# Patient Record
Sex: MALE | Race: BLACK OR AFRICAN AMERICAN
[De-identification: names, ages, dates, MRNs, and addresses within clinical notes are randomized per-mention and may not be internally consistent; named-entity substitution may affect disease eponyms.]

---

## 2019-05-29 ENCOUNTER — HOSPITAL ENCOUNTER (INPATIENT)
Dept: HOSPITAL 74 - YASAS | Age: 54
LOS: 3 days | Discharge: OTHER INSTITUTION | End: 2019-06-01
Attending: SURGERY | Admitting: SURGERY
Payer: COMMERCIAL

## 2019-05-29 VITALS — BODY MASS INDEX: 18.1 KG/M2

## 2019-05-29 DIAGNOSIS — B18.2: ICD-10-CM

## 2019-05-29 DIAGNOSIS — K21.9: ICD-10-CM

## 2019-05-29 DIAGNOSIS — Z87.828: ICD-10-CM

## 2019-05-29 DIAGNOSIS — I10: ICD-10-CM

## 2019-05-29 DIAGNOSIS — F10.230: Primary | ICD-10-CM

## 2019-05-29 DIAGNOSIS — Z21: ICD-10-CM

## 2019-05-29 PROCEDURE — HZ2ZZZZ DETOXIFICATION SERVICES FOR SUBSTANCE ABUSE TREATMENT: ICD-10-PCS | Performed by: SURGERY

## 2019-05-29 RX ADMIN — DILTIAZEM HYDROCHLORIDE SCH MG: 30 TABLET, FILM COATED ORAL at 22:33

## 2019-05-29 RX ADMIN — Medication SCH MG: at 22:33

## 2019-05-29 RX ADMIN — LISINOPRIL SCH MG: 10 TABLET ORAL at 16:06

## 2019-05-29 NOTE — HP
CIWA Score


Nausea/Vomitin-Mild Nausea/No Vomiting


Muscle Tremors: 3


Anxiety: 2


Agitation: 3


Paroxysmal Sweats: 2


Orientation: 0-Oriented


Tacttile Disturbances: 0-None


Auditory Disturbances: 0-None


Visual Disturbances: 0-None


Headache: 0-None Present


CIWA-Ar Total Score: 11





- Admission Criteria


[facility] Guidelines: Admission for Medically Managed Detox: 


Requires at least one of the followin. CIWA greater than 12


2. Seizures within the past 24 hours


3. Delirium tremens within the past 24 hours


4. Hallucinations within the past 24 hours


5. Acute intervention needed for co  occurring medical disorder


6. Acute intervention needed for co  occurring psychiatric disorder


7. Severe withdrawal that cannot be handled at a lower level of care (continued


    vomiting, continued diarrhea, abnormal vital signs) requiring intravenous


    medication and/or fluids


8. Pregnancy








Admission ROS S





- HPI


Chief Complaint: 





I drink too much and need to stop. 


Allergies/Adverse Reactions: 


 Allergies











Allergy/AdvReac Type Severity Reaction Status Date / Time


 


No Known Allergies Allergy   Verified 19 11:29











History of Present Illness: 





pt is a 53yr old male with a history of alcohol dependence seeking detox for 

treatment. Pt has had h/o of falls d/t intoxication. pt states he fell about 3 

weeks ago and had head injury and had staples placed and now they are removed. 

Pt has a healed scar to back of head. Pt denies any headaches at this time.


pt has h/t HIV, HTN, acid reflux.  


Exam Limitations: No Limitations





- Ebola screening


Have you traveled outside of the country in the last 21 days: No (N)


Have you had contact with anyone from an Ebola affected area: No


Have you been sick,other than usual withdrawal symptoms: No


Do you have a fever: No





- Review of Systems


Constitutional: Chills, Diaphoresis, Night Sweats


EENT: reports: Tearing, Dental Problems (missing teeth)


Respiratory: reports: No Symptoms reported


Cardiac: reports: Lightheadedness


GI: reports: Poor Appetite, Poor Fluid Intake


: reports: No Symptoms Reported


Musculoskeletal: reports: No Symptoms Reported


Integumentary: reports: Lumps (healed scar to back of head)


Neuro: reports: Tingling, Tremors


Endocrine: reports: Excessive Sweating, Flushing, Intolerance to Cold, 

Intolerance to Heat


Hematology: reports: No Symptoms Reported


Psychiatric: reports: Judgement Intact, Mood/Affect Appropiate, Orientated x3, 

Agitated, Anxious, Depressed


Other Systems: Reviewed and Negative





Patient History





- Patient Medical History


Hx Anemia: No


Hx Asthma: No


Hx Chronic Obstructive Pulmonary Disease (COPD): No


Hx Cancer: No


Hx Cardiac Disorders: No


Hx Congestive Heart Failure: No


Hx Hypertension: Yes


Hx Hypercholesterolemia: No


Hx Pacemaker: No


HX Cerebrovascular Accident: No


Hx Seizures: No


Hx Dementia: No


Hx Diabetes: No


Hx Gastrointestinal Disorders: No


Hx Liver Disease: No


Hx Genitourinary Disorders: No


Hx Sexually Transmitted Disorders: No


Hx Renal Disease (ESRD): No


Hx Thyroid Disease: No


Hx Human Immunodeficiency Virus (HIV): Yes (dx in , currently on medication)


Hx Hepatitis C: Yes (never treatment.)


Hx Depression: No


Hx Suicide Attempt: No (denies)


Hx Bipolar Disorder: No


Hx Schizophrenia: No





- Patient Surgical History


Past Surgical History: No





- PPD History


Previous Implant?: Yes


Documented Results: Negative w/o proof


PPD to be Administered?: Yes





- Reproductive History


Patient is a Female of Child Bearing Age (11 -55 yrs old): No





- Smoking Cessation


Smoking history: Current every day smoker


Have you smoked in the past 12 months: Yes


Aproximately how many cigarettes per day: 1


Hx Chewing Tobacco Use: No


Initiated information on smoking cessation: Yes


'Breaking Loose' booklet given: 19





- Substance & Tx. History


Hx Alcohol Use: Yes


Hx Substance Use: No


Substance Use Type: Alcohol


Hx Substance Use Treatment: No





- Substances abused


  ** Alcohol


Substance route: Oral


Frequency: Daily


Amount used: Beers/Liquor- a lot


Age of first use: 16


Date of last use: 19





Family Disease History





- Family Disease History


Family History: Denies





Admission Physical Exam [facility]





- Vital Signs


Vital Signs: 


 Vital Signs - 24 hr











  19





  11:54


 


Temperature 98.3 F


 


Pulse Rate 88


 


Respiratory 18





Rate 


 


Blood Pressure 150/84














- Physical


General Appearance: Yes: Appropriately Dressed, Mild Distress, Thin, Tremorous, 

Irritable, Sweating, Anxious


HEENTM: Yes: Normal Voice


Respiratory: Yes: Lungs Clear, Normal Breath Sounds, No Respiratory Distress


Neck: Yes: Within Normal Limits


Breast: Yes: Within Normal Limits


Cardiology: Yes: Regular Rhythm, Regular Rate, S1, S2


Abdominal: Yes: Normal Bowel Sounds, Non Tender, Soft


Genitourinary: Yes: Within Normal Limits


Back: Yes: Normal Inspection


Musculoskeletal: Yes: full range of Motion, Gait Steady, Back pain


Extremities: Yes: Normal Capillary Refill, Non-Tender, Tremors


Neurological: Yes: Fully Oriented, Alert, Normal Response


Integumentary: Yes: Normal Color


Lymphatic: Yes: Within Normal Limits





- Diagnostic


(1) Alcohol dependence with uncomplicated withdrawal


Current Visit: Yes   Status: Chronic   





(2) Head lump


Current Visit: No   Status: Chronic   





(3) H/O head injury without skull fracture


Current Visit: No   Status: Chronic   





(4) HIV disease


Current Visit: Yes   Status: Chronic   Comment: currently on descovy and 

tivicay.   





(5) Hepatitis C virus


Current Visit: Yes   Status: Chronic   


Qualifiers: 


   Viral hepatitis chronicity: chronic   Hepatic coma status: without hepatic 

coma   Qualified Code(s): B18.2 - Chronic viral hepatitis C   





Cleared for Admission S





- Detox or Rehab


[facility] Level of Care: Medically Managed


Detox Regimen/Protocol: Librium





Breathalyzer





- Breathalyzer


Breathalyzer: 0





Urine Drug Screen





- Test Device


Lot number: [id number]


Expiration date: 20





- Control


Is test valid?: Yes





- Results


Drug screen NEGATIVE: No


Urine drug screen results: THC-Marijuana, KATTY-Cocaine, BZO-Benzodiazepines





Inpatient Rehab Admission





- Rehab Decision to Admit


Inpatient rehab admission?: No

## 2019-05-29 NOTE — EKG
Test Reason : 

Blood Pressure : ***/*** mmHG

Vent. Rate : 084 BPM     Atrial Rate : 084 BPM

   P-R Int : 156 ms          QRS Dur : 076 ms

    QT Int : 392 ms       P-R-T Axes : 058 -02 034 degrees

   QTc Int : 463 ms

 

NORMAL SINUS RHYTHM

POSSIBLE LEFT ATRIAL ENLARGEMENT

BORDERLINE ECG

NO PREVIOUS ECGS AVAILABLE

Confirmed by HORACIO CARBAJAL MD ([id number]) on 5/29/2019 5:13:33 PM

 

Referred By:             Confirmed By:HORACIO CARBAJAL MD

## 2019-05-30 RX ADMIN — LISINOPRIL SCH MG: 10 TABLET ORAL at 10:34

## 2019-05-30 RX ADMIN — DILTIAZEM HYDROCHLORIDE SCH MG: 30 TABLET, FILM COATED ORAL at 22:07

## 2019-05-30 RX ADMIN — EMTRICITABINE AND TENOFOVIR ALAFENAMIDE SCH EACH: 200; 25 TABLET ORAL at 07:27

## 2019-05-30 RX ADMIN — Medication SCH TAB: at 10:34

## 2019-05-30 RX ADMIN — DOLUTEGRAVIR SODIUM SCH MG: 50 TABLET, FILM COATED ORAL at 07:27

## 2019-05-30 RX ADMIN — NICOTINE SCH: 7 PATCH TRANSDERMAL at 10:34

## 2019-05-30 RX ADMIN — DILTIAZEM HYDROCHLORIDE SCH MG: 30 TABLET, FILM COATED ORAL at 14:19

## 2019-05-30 RX ADMIN — RANITIDINE SCH MG: 150 TABLET ORAL at 20:25

## 2019-05-30 RX ADMIN — DILTIAZEM HYDROCHLORIDE SCH MG: 30 TABLET, FILM COATED ORAL at 06:25

## 2019-05-30 RX ADMIN — Medication SCH MG: at 22:07

## 2019-05-30 NOTE — PN
[facility] CIWA





- CIWA Score


Nausea/Vomitin


Muscle Tremors: 2


Anxiety: 2


Agitation: 2


Paroxysmal Sweats: 1-Minimal Palms Moist


Orientation: 0-Oriented


Tacttile Disturbances: 1-Very Mild Itch/Numbness


Auditory Disturbances: 1-Very Mild


Visual Disturbances: 0-None


Headache: 2-Mild


CIWA-Ar Total Score: 13





[facility] Progress Note (SOAP)


Subjective: 





alert,irritable,anxious,interrupted sleep,tremor


Objective: 





19 11:25


 Vital Signs











Temperature  96.8 F L  19 09:51


 


Pulse Rate  81   19 09:51


 


Respiratory Rate  18   19 09:51


 


Blood Pressure  140/91   19 09:51


 


O2 Sat by Pulse Oximetry (%)      











19 11:25


labs pending


Assessment: 





19 11:26


withdrawal symptom


Plan: 





continue detox

## 2019-05-31 LAB
ALBUMIN SERPL-MCNC: 3.3 G/DL (ref 3.4–5)
ALP SERPL-CCNC: 89 U/L (ref 45–117)
ALT SERPL-CCNC: 28 U/L (ref 13–61)
ANION GAP SERPL CALC-SCNC: 4 MMOL/L (ref 8–16)
AST SERPL-CCNC: 34 U/L (ref 15–37)
BILIRUB SERPL-MCNC: 0.3 MG/DL (ref 0.2–1)
BUN SERPL-MCNC: 8 MG/DL (ref 7–18)
CALCIUM SERPL-MCNC: 9.4 MG/DL (ref 8.5–10.1)
CHLORIDE SERPL-SCNC: 109 MMOL/L (ref 98–107)
CO2 SERPL-SCNC: 25 MMOL/L (ref 21–32)
CREAT SERPL-MCNC: 0.7 MG/DL (ref 0.55–1.3)
DEPRECATED RDW RBC AUTO: 16 % (ref 11.9–15.9)
GLUCOSE SERPL-MCNC: 78 MG/DL (ref 74–106)
HCT VFR BLD CALC: 34.8 % (ref 35.4–49)
HGB BLD-MCNC: 11.9 GM/DL (ref 11.7–16.9)
MCH RBC QN AUTO: 34.1 PG (ref 25.7–33.7)
MCHC RBC AUTO-ENTMCNC: 34.3 G/DL (ref 32–35.9)
MCV RBC: 99.5 FL (ref 80–96)
PLATELET # BLD AUTO: 225 K/MM3 (ref 134–434)
PMV BLD: 8.2 FL (ref 7.5–11.1)
POTASSIUM SERPLBLD-SCNC: 3.9 MMOL/L (ref 3.5–5.1)
PROT SERPL-MCNC: 7.1 G/DL (ref 6.4–8.2)
RBC # BLD AUTO: 3.5 M/MM3 (ref 4–5.6)
SODIUM SERPL-SCNC: 138 MMOL/L (ref 136–145)
WBC # BLD AUTO: 3.5 K/MM3 (ref 4–10)

## 2019-05-31 RX ADMIN — RANITIDINE SCH MG: 150 TABLET ORAL at 10:43

## 2019-05-31 RX ADMIN — EMTRICITABINE AND TENOFOVIR ALAFENAMIDE SCH EACH: 200; 25 TABLET ORAL at 07:02

## 2019-05-31 RX ADMIN — Medication SCH MG: at 22:31

## 2019-05-31 RX ADMIN — DILTIAZEM HYDROCHLORIDE SCH MG: 30 TABLET, FILM COATED ORAL at 22:31

## 2019-05-31 RX ADMIN — DRONABINOL SCH MG: 2.5 CAPSULE ORAL at 17:06

## 2019-05-31 RX ADMIN — LISINOPRIL SCH MG: 10 TABLET ORAL at 10:42

## 2019-05-31 RX ADMIN — DRONABINOL SCH MG: 2.5 CAPSULE ORAL at 11:44

## 2019-05-31 RX ADMIN — DILTIAZEM HYDROCHLORIDE SCH MG: 30 TABLET, FILM COATED ORAL at 06:10

## 2019-05-31 RX ADMIN — DILTIAZEM HYDROCHLORIDE SCH MG: 30 TABLET, FILM COATED ORAL at 13:58

## 2019-05-31 RX ADMIN — NICOTINE SCH: 7 PATCH TRANSDERMAL at 10:42

## 2019-05-31 RX ADMIN — Medication SCH TAB: at 10:42

## 2019-05-31 RX ADMIN — DOLUTEGRAVIR SODIUM SCH MG: 50 TABLET, FILM COATED ORAL at 07:02

## 2019-05-31 NOTE — PN
[facility] CIWA





- CIWA Score


Nausea/Vomitin


Muscle Tremors: 2


Anxiety: 2


Agitation: 2


Paroxysmal Sweats: 1-Minimal Palms Moist


Orientation: 0-Oriented


Tacttile Disturbances: 1-Very Mild Itch/Numbness


Auditory Disturbances: 1-Very Mild


Visual Disturbances: 0-None


Headache: 2-Mild


CIWA-Ar Total Score: 13





BHS Progress Note (SOAP)


Subjective: 





alert,irritable,anxious,interrupted sleep,poor appetite stated taking marinol 

at home


Objective: 





19 09:59


 Vital Signs











Temperature  97.7 F   19 06:18


 


Pulse Rate  84   19 06:18


 


Respiratory Rate  18   19 06:18


 


Blood Pressure  133/81   19 06:18


 


O2 Sat by Pulse Oximetry (%)      











19 10:00


labs pending


Assessment: 





19 10:00


withdrawal symptom


Plan: 





continue detox,marinol 2.5 mgs po bid ,1 hr before lunch and dinner

## 2019-06-01 ENCOUNTER — HOSPITAL ENCOUNTER (INPATIENT)
Dept: HOSPITAL 74 - YASAS | Age: 54
LOS: 13 days | Discharge: HOME | End: 2019-06-14
Payer: COMMERCIAL

## 2019-06-01 VITALS — TEMPERATURE: 98.3 F | DIASTOLIC BLOOD PRESSURE: 92 MMHG | SYSTOLIC BLOOD PRESSURE: 121 MMHG

## 2019-06-01 VITALS — HEART RATE: 96 BPM

## 2019-06-01 DIAGNOSIS — F10.20: Primary | ICD-10-CM

## 2019-06-01 DIAGNOSIS — R63.6: ICD-10-CM

## 2019-06-01 DIAGNOSIS — Z87.828: ICD-10-CM

## 2019-06-01 DIAGNOSIS — Z21: ICD-10-CM

## 2019-06-01 DIAGNOSIS — B18.2: ICD-10-CM

## 2019-06-01 DIAGNOSIS — Z91.81: ICD-10-CM

## 2019-06-01 DIAGNOSIS — I10: ICD-10-CM

## 2019-06-01 PROCEDURE — HZ42ZZZ GROUP COUNSELING FOR SUBSTANCE ABUSE TREATMENT, COGNITIVE-BEHAVIORAL: ICD-10-PCS

## 2019-06-01 RX ADMIN — DRONABINOL SCH MG: 2.5 CAPSULE ORAL at 10:58

## 2019-06-01 RX ADMIN — RANITIDINE SCH MG: 150 TABLET ORAL at 10:58

## 2019-06-01 RX ADMIN — Medication SCH TAB: at 10:58

## 2019-06-01 RX ADMIN — LISINOPRIL SCH MG: 10 TABLET ORAL at 10:58

## 2019-06-01 RX ADMIN — DILTIAZEM HYDROCHLORIDE SCH MG: 30 TABLET, FILM COATED ORAL at 13:17

## 2019-06-01 RX ADMIN — EMTRICITABINE AND TENOFOVIR ALAFENAMIDE SCH EACH: 200; 25 TABLET ORAL at 07:14

## 2019-06-01 RX ADMIN — DOLUTEGRAVIR SODIUM SCH MG: 50 TABLET, FILM COATED ORAL at 07:14

## 2019-06-01 RX ADMIN — DILTIAZEM HYDROCHLORIDE SCH MG: 30 TABLET, FILM COATED ORAL at 06:12

## 2019-06-01 RX ADMIN — NICOTINE SCH: 7 PATCH TRANSDERMAL at 10:58

## 2019-06-01 NOTE — DS
[facility] Detox Discharge Summary


Admission Date: 


05/29/19





Discharge Date: 06/01/19





- History


Present History: Alcohol Dependence


Additional Comments: 





Pt is medically cleared and is discharged to [facility] inpatient rehab for 

continued care and management.


Pertinent Past History: 





pt has h/o HIV, HTN, acid reflux.  





- Physical Exam Results


Vital Signs: 


 Vital Signs











Temperature  98.3 F   06/01/19 09:04


 


Pulse Rate  96 H  06/01/19 09:04


 


Respiratory Rate  18   06/01/19 09:04


 


Blood Pressure  121/92   06/01/19 09:04


 


O2 Sat by Pulse Oximetry (%)      








 Vital Signs - 24 hr











  05/31/19 05/31/19 06/01/19





  18:27 22:11 00:30


 


Temperature 98.2 F 96.8 F L 


 


Pulse Rate 91 H 83 


 


Respiratory 18 18 18





Rate   


 


Blood Pressure 145/94 137/93 














  06/01/19 06/01/19 06/01/19





  03:30 06:00 09:04


 


Temperature  97.9 F 98.3 F


 


Pulse Rate  96 H 96 H


 


Respiratory 18 16 18





Rate   


 


Blood Pressure  135/90 121/92











Pertinent Admission Physical Exam Findings: 





withdrawal symptoms.





- Treatment


Hospital Course: Detox Protocol Followed, Detoxed Safely, Responded well, 

Discharged Condition Good, Rehab Referral Accepted


Patient has Accepted a Rehab Referral to: [facility] inpatient rehab [facility]





- Medication


Discharge Medications: 


Ambulatory Orders





Dolutegravir Sodium [Tivicay] 25 mg PO DAILY 05/29/19 


Emtricitabine/Tenofov Alafenam [Descovy 200-25 mg Tablet (Nf)] 1 each PO DAILY 

05/29/19 











- Diagnosis


(1) Alcohol dependence with uncomplicated withdrawal


Status: Chronic   





(2) H/O head injury without skull fracture


Status: Chronic   





(3) HIV disease


Status: Chronic   





(4) Hepatitis C virus


Status: Chronic   


Qualifiers: 


   Viral hepatitis chronicity: chronic   Hepatic coma status: without hepatic 

coma   Qualified Code(s): B18.2 - Chronic viral hepatitis C   





- AMA


Did Patient Leave Against Medical Advice: No

## 2021-03-03 ENCOUNTER — HOSPITAL ENCOUNTER (INPATIENT)
Dept: HOSPITAL 74 - YASAS | Age: 56
LOS: 5 days | Discharge: OTHER INSTITUTION | End: 2021-03-08
Attending: ALLERGY & IMMUNOLOGY | Admitting: ALLERGY & IMMUNOLOGY
Payer: COMMERCIAL

## 2021-03-03 VITALS — BODY MASS INDEX: 22.3 KG/M2

## 2021-03-03 DIAGNOSIS — I10: ICD-10-CM

## 2021-03-03 DIAGNOSIS — R76.8: ICD-10-CM

## 2021-03-03 DIAGNOSIS — B18.2: ICD-10-CM

## 2021-03-03 DIAGNOSIS — F10.230: Primary | ICD-10-CM

## 2021-03-03 DIAGNOSIS — Z86.19: ICD-10-CM

## 2021-03-03 DIAGNOSIS — Z87.828: ICD-10-CM

## 2021-03-03 DIAGNOSIS — Z99.89: ICD-10-CM

## 2021-03-03 DIAGNOSIS — F17.210: ICD-10-CM

## 2021-03-03 DIAGNOSIS — G62.9: ICD-10-CM

## 2021-03-03 DIAGNOSIS — Z21: ICD-10-CM

## 2021-03-03 DIAGNOSIS — F10.220: ICD-10-CM

## 2021-03-03 PROCEDURE — HZ2ZZZZ DETOXIFICATION SERVICES FOR SUBSTANCE ABUSE TREATMENT: ICD-10-PCS | Performed by: ALLERGY & IMMUNOLOGY

## 2021-03-03 PROCEDURE — U0003 INFECTIOUS AGENT DETECTION BY NUCLEIC ACID (DNA OR RNA); SEVERE ACUTE RESPIRATORY SYNDROME CORONAVIRUS 2 (SARS-COV-2) (CORONAVIRUS DISEASE [COVID-19]), AMPLIFIED PROBE TECHNIQUE, MAKING USE OF HIGH THROUGHPUT TECHNOLOGIES AS DESCRIBED BY CMS-2020-01-R: HCPCS

## 2021-03-03 PROCEDURE — C9803 HOPD COVID-19 SPEC COLLECT: HCPCS

## 2021-03-03 RX ADMIN — DRONABINOL SCH MG: 2.5 CAPSULE ORAL at 17:42

## 2021-03-03 RX ADMIN — Medication SCH MG: at 22:20

## 2021-03-03 RX ADMIN — Medication SCH: at 22:19

## 2021-03-03 RX ADMIN — HYDROXYZINE PAMOATE SCH: 25 CAPSULE ORAL at 17:42

## 2021-03-03 RX ADMIN — Medication SCH: at 17:46

## 2021-03-03 RX ADMIN — HYDROXYZINE PAMOATE SCH: 25 CAPSULE ORAL at 22:20

## 2021-03-04 LAB
ALBUMIN SERPL-MCNC: 3.4 G/DL (ref 3.4–5)
ALP SERPL-CCNC: 89 U/L (ref 45–117)
ALT SERPL-CCNC: 29 U/L (ref 13–61)
ANION GAP SERPL CALC-SCNC: 9 MMOL/L (ref 8–16)
AST SERPL-CCNC: 75 U/L (ref 15–37)
BILIRUB SERPL-MCNC: 0.3 MG/DL (ref 0.2–1)
BUN SERPL-MCNC: 8.5 MG/DL (ref 7–18)
CALCIUM SERPL-MCNC: 8.7 MG/DL (ref 8.5–10.1)
CHLORIDE SERPL-SCNC: 108 MMOL/L (ref 98–107)
CO2 SERPL-SCNC: 24 MMOL/L (ref 21–32)
CREAT SERPL-MCNC: 0.7 MG/DL (ref 0.55–1.3)
DEPRECATED RDW RBC AUTO: 16.3 % (ref 11.9–15.9)
GLUCOSE SERPL-MCNC: 84 MG/DL (ref 74–106)
HCT VFR BLD CALC: 34 % (ref 35.4–49)
HGB BLD-MCNC: 11.8 GM/DL (ref 11.7–16.9)
MCH RBC QN AUTO: 32.1 PG (ref 25.7–33.7)
MCHC RBC AUTO-ENTMCNC: 34.8 G/DL (ref 32–35.9)
MCV RBC: 92.4 FL (ref 80–96)
PLATELET # BLD AUTO: 229 K/MM3 (ref 134–434)
PMV BLD: 8.5 FL (ref 7.5–11.1)
POTASSIUM SERPLBLD-SCNC: 3.5 MMOL/L (ref 3.5–5.1)
PROT SERPL-MCNC: 7.6 G/DL (ref 6.4–8.2)
RBC # BLD AUTO: 3.69 M/MM3 (ref 4–5.6)
SODIUM SERPL-SCNC: 141 MMOL/L (ref 136–145)
WBC # BLD AUTO: 4.1 K/MM3 (ref 4–10)

## 2021-03-04 RX ADMIN — EMTRICITABINE AND TENOFOVIR ALAFENAMIDE SCH EACH: 200; 25 TABLET ORAL at 11:05

## 2021-03-04 RX ADMIN — LISINOPRIL SCH MG: 10 TABLET ORAL at 11:09

## 2021-03-04 RX ADMIN — DRONABINOL SCH MG: 2.5 CAPSULE ORAL at 18:24

## 2021-03-04 RX ADMIN — PANTOPRAZOLE SODIUM SCH MG: 40 TABLET, DELAYED RELEASE ORAL at 11:06

## 2021-03-04 RX ADMIN — Medication SCH MG: at 22:51

## 2021-03-04 RX ADMIN — HYDROXYZINE PAMOATE SCH MG: 25 CAPSULE ORAL at 05:19

## 2021-03-04 RX ADMIN — DRONABINOL SCH MG: 2.5 CAPSULE ORAL at 07:10

## 2021-03-04 RX ADMIN — Medication SCH TAB: at 11:05

## 2021-03-04 RX ADMIN — Medication SCH MG: at 22:52

## 2021-03-05 RX ADMIN — LISINOPRIL SCH MG: 10 TABLET ORAL at 10:34

## 2021-03-05 RX ADMIN — DRONABINOL SCH MG: 2.5 CAPSULE ORAL at 07:46

## 2021-03-05 RX ADMIN — DRONABINOL SCH MG: 2.5 CAPSULE ORAL at 18:27

## 2021-03-05 RX ADMIN — Medication SCH: at 22:45

## 2021-03-05 RX ADMIN — Medication SCH TAB: at 10:34

## 2021-03-05 RX ADMIN — PANTOPRAZOLE SODIUM SCH MG: 40 TABLET, DELAYED RELEASE ORAL at 10:34

## 2021-03-05 RX ADMIN — DOLUTEGRAVIR SODIUM SCH MG: 50 TABLET, FILM COATED ORAL at 10:34

## 2021-03-05 RX ADMIN — EMTRICITABINE AND TENOFOVIR ALAFENAMIDE SCH EACH: 200; 25 TABLET ORAL at 10:34

## 2021-03-05 RX ADMIN — Medication SCH MG: at 22:45

## 2021-03-06 RX ADMIN — DRONABINOL SCH MG: 2.5 CAPSULE ORAL at 17:56

## 2021-03-06 RX ADMIN — Medication SCH TAB: at 10:24

## 2021-03-06 RX ADMIN — EMTRICITABINE AND TENOFOVIR ALAFENAMIDE SCH EACH: 200; 25 TABLET ORAL at 10:24

## 2021-03-06 RX ADMIN — PANTOPRAZOLE SODIUM SCH MG: 40 TABLET, DELAYED RELEASE ORAL at 10:23

## 2021-03-06 RX ADMIN — DOLUTEGRAVIR SODIUM SCH MG: 50 TABLET, FILM COATED ORAL at 10:24

## 2021-03-06 RX ADMIN — DRONABINOL SCH MG: 2.5 CAPSULE ORAL at 10:23

## 2021-03-06 RX ADMIN — Medication SCH: at 22:13

## 2021-03-06 RX ADMIN — Medication SCH MG: at 22:13

## 2021-03-06 RX ADMIN — LISINOPRIL SCH MG: 10 TABLET ORAL at 10:23

## 2021-03-07 RX ADMIN — Medication SCH: at 22:41

## 2021-03-07 RX ADMIN — LISINOPRIL SCH MG: 10 TABLET ORAL at 11:04

## 2021-03-07 RX ADMIN — Medication SCH TAB: at 11:04

## 2021-03-07 RX ADMIN — PANTOPRAZOLE SODIUM SCH MG: 40 TABLET, DELAYED RELEASE ORAL at 11:05

## 2021-03-07 RX ADMIN — DRONABINOL SCH MG: 2.5 CAPSULE ORAL at 08:09

## 2021-03-07 RX ADMIN — DOLUTEGRAVIR SODIUM SCH MG: 50 TABLET, FILM COATED ORAL at 11:05

## 2021-03-07 RX ADMIN — DRONABINOL SCH MG: 2.5 CAPSULE ORAL at 18:13

## 2021-03-07 RX ADMIN — EMTRICITABINE AND TENOFOVIR ALAFENAMIDE SCH EACH: 200; 25 TABLET ORAL at 11:06

## 2021-03-07 RX ADMIN — Medication SCH: at 22:40

## 2021-03-08 ENCOUNTER — HOSPITAL ENCOUNTER (INPATIENT)
Dept: HOSPITAL 74 - YASAS | Age: 56
LOS: 14 days | Discharge: HOME | DRG: 772 | End: 2021-03-22
Attending: ALLERGY & IMMUNOLOGY | Admitting: ALLERGY & IMMUNOLOGY
Payer: COMMERCIAL

## 2021-03-08 VITALS — DIASTOLIC BLOOD PRESSURE: 71 MMHG | TEMPERATURE: 96.8 F | HEART RATE: 107 BPM | SYSTOLIC BLOOD PRESSURE: 115 MMHG

## 2021-03-08 DIAGNOSIS — Z99.89: ICD-10-CM

## 2021-03-08 DIAGNOSIS — B18.2: ICD-10-CM

## 2021-03-08 DIAGNOSIS — Z87.828: ICD-10-CM

## 2021-03-08 DIAGNOSIS — Z86.19: ICD-10-CM

## 2021-03-08 DIAGNOSIS — K21.9: ICD-10-CM

## 2021-03-08 DIAGNOSIS — R64: ICD-10-CM

## 2021-03-08 DIAGNOSIS — R35.0: ICD-10-CM

## 2021-03-08 DIAGNOSIS — Z21: ICD-10-CM

## 2021-03-08 DIAGNOSIS — F10.20: Primary | ICD-10-CM

## 2021-03-08 PROCEDURE — C9803 HOPD COVID-19 SPEC COLLECT: HCPCS

## 2021-03-08 PROCEDURE — HZ42ZZZ GROUP COUNSELING FOR SUBSTANCE ABUSE TREATMENT, COGNITIVE-BEHAVIORAL: ICD-10-PCS | Performed by: ALLERGY & IMMUNOLOGY

## 2021-03-08 PROCEDURE — U0003 INFECTIOUS AGENT DETECTION BY NUCLEIC ACID (DNA OR RNA); SEVERE ACUTE RESPIRATORY SYNDROME CORONAVIRUS 2 (SARS-COV-2) (CORONAVIRUS DISEASE [COVID-19]), AMPLIFIED PROBE TECHNIQUE, MAKING USE OF HIGH THROUGHPUT TECHNOLOGIES AS DESCRIBED BY CMS-2020-01-R: HCPCS

## 2021-03-08 RX ADMIN — PANTOPRAZOLE SODIUM SCH: 40 TABLET, DELAYED RELEASE ORAL at 11:11

## 2021-03-08 RX ADMIN — DRONABINOL SCH MG: 2.5 CAPSULE ORAL at 17:22

## 2021-03-08 RX ADMIN — Medication SCH: at 11:11

## 2021-03-08 RX ADMIN — Medication SCH MG: at 21:46

## 2021-03-08 RX ADMIN — Medication SCH: at 21:46

## 2021-03-08 RX ADMIN — DOLUTEGRAVIR SODIUM SCH: 50 TABLET, FILM COATED ORAL at 11:11

## 2021-03-08 RX ADMIN — LISINOPRIL SCH: 10 TABLET ORAL at 11:11

## 2021-03-08 RX ADMIN — DRONABINOL SCH MG: 2.5 CAPSULE ORAL at 07:39

## 2021-03-08 RX ADMIN — EMTRICITABINE AND TENOFOVIR ALAFENAMIDE SCH: 200; 25 TABLET ORAL at 11:10

## 2021-03-09 RX ADMIN — Medication SCH TAB: at 10:46

## 2021-03-09 RX ADMIN — NICOTINE SCH: 7 PATCH TRANSDERMAL at 10:48

## 2021-03-09 RX ADMIN — PANTOPRAZOLE SODIUM SCH MG: 40 TABLET, DELAYED RELEASE ORAL at 10:50

## 2021-03-09 RX ADMIN — DRONABINOL SCH MG: 2.5 CAPSULE ORAL at 17:07

## 2021-03-09 RX ADMIN — DRONABINOL SCH MG: 2.5 CAPSULE ORAL at 07:14

## 2021-03-09 RX ADMIN — LISINOPRIL SCH MG: 10 TABLET ORAL at 10:47

## 2021-03-09 RX ADMIN — Medication SCH MG: at 21:31

## 2021-03-09 RX ADMIN — Medication SCH MG: at 21:30

## 2021-03-09 RX ADMIN — DOLUTEGRAVIR SODIUM SCH MG: 50 TABLET, FILM COATED ORAL at 10:47

## 2021-03-09 RX ADMIN — EMTRICITABINE AND TENOFOVIR ALAFENAMIDE SCH EACH: 200; 25 TABLET ORAL at 10:47

## 2021-03-09 RX ADMIN — DRONABINOL SCH MG: 2.5 CAPSULE ORAL at 10:50

## 2021-03-10 RX ADMIN — DRONABINOL SCH MG: 2.5 CAPSULE ORAL at 06:12

## 2021-03-10 RX ADMIN — DRONABINOL SCH MG: 2.5 CAPSULE ORAL at 17:10

## 2021-03-10 RX ADMIN — Medication SCH MG: at 21:24

## 2021-03-10 RX ADMIN — DRONABINOL SCH MG: 2.5 CAPSULE ORAL at 11:49

## 2021-03-10 RX ADMIN — DOLUTEGRAVIR SODIUM SCH MG: 50 TABLET, FILM COATED ORAL at 10:14

## 2021-03-10 RX ADMIN — EMTRICITABINE AND TENOFOVIR ALAFENAMIDE SCH EACH: 200; 25 TABLET ORAL at 10:14

## 2021-03-10 RX ADMIN — LISINOPRIL SCH MG: 10 TABLET ORAL at 10:14

## 2021-03-10 RX ADMIN — NICOTINE SCH: 7 PATCH TRANSDERMAL at 10:15

## 2021-03-10 RX ADMIN — Medication SCH TAB: at 10:15

## 2021-03-10 RX ADMIN — PANTOPRAZOLE SODIUM SCH MG: 40 TABLET, DELAYED RELEASE ORAL at 10:14

## 2021-03-11 RX ADMIN — LISINOPRIL SCH: 10 TABLET ORAL at 11:37

## 2021-03-11 RX ADMIN — DOLUTEGRAVIR SODIUM SCH MG: 50 TABLET, FILM COATED ORAL at 10:20

## 2021-03-11 RX ADMIN — DRONABINOL SCH MG: 2.5 CAPSULE ORAL at 06:19

## 2021-03-11 RX ADMIN — NICOTINE SCH: 7 PATCH TRANSDERMAL at 10:21

## 2021-03-11 RX ADMIN — PANTOPRAZOLE SODIUM SCH MG: 40 TABLET, DELAYED RELEASE ORAL at 10:20

## 2021-03-11 RX ADMIN — Medication SCH TAB: at 10:21

## 2021-03-11 RX ADMIN — EMTRICITABINE AND TENOFOVIR ALAFENAMIDE SCH EACH: 200; 25 TABLET ORAL at 10:20

## 2021-03-11 RX ADMIN — DRONABINOL SCH MG: 2.5 CAPSULE ORAL at 17:10

## 2021-03-11 RX ADMIN — Medication SCH MG: at 21:39

## 2021-03-11 RX ADMIN — DRONABINOL SCH MG: 2.5 CAPSULE ORAL at 12:01

## 2021-03-12 RX ADMIN — LISINOPRIL SCH: 10 TABLET ORAL at 10:56

## 2021-03-12 RX ADMIN — Medication SCH MG: at 21:34

## 2021-03-12 RX ADMIN — Medication SCH TAB: at 09:59

## 2021-03-12 RX ADMIN — HYDROXYZINE PAMOATE PRN MG: 25 CAPSULE ORAL at 21:35

## 2021-03-12 RX ADMIN — DOLUTEGRAVIR SODIUM SCH MG: 50 TABLET, FILM COATED ORAL at 10:01

## 2021-03-12 RX ADMIN — PANTOPRAZOLE SODIUM SCH MG: 40 TABLET, DELAYED RELEASE ORAL at 09:58

## 2021-03-12 RX ADMIN — NICOTINE SCH: 7 PATCH TRANSDERMAL at 09:59

## 2021-03-12 RX ADMIN — EMTRICITABINE AND TENOFOVIR ALAFENAMIDE SCH EACH: 200; 25 TABLET ORAL at 09:59

## 2021-03-12 RX ADMIN — DRONABINOL SCH MG: 2.5 CAPSULE ORAL at 06:31

## 2021-03-12 RX ADMIN — DRONABINOL SCH MG: 2.5 CAPSULE ORAL at 11:14

## 2021-03-12 RX ADMIN — DRONABINOL SCH MG: 2.5 CAPSULE ORAL at 16:42

## 2021-03-13 RX ADMIN — HYDROXYZINE PAMOATE PRN MG: 25 CAPSULE ORAL at 10:36

## 2021-03-13 RX ADMIN — DOLUTEGRAVIR SODIUM SCH MG: 50 TABLET, FILM COATED ORAL at 10:38

## 2021-03-13 RX ADMIN — DRONABINOL SCH MG: 2.5 CAPSULE ORAL at 16:58

## 2021-03-13 RX ADMIN — Medication SCH TAB: at 10:36

## 2021-03-13 RX ADMIN — PANTOPRAZOLE SODIUM SCH MG: 40 TABLET, DELAYED RELEASE ORAL at 10:36

## 2021-03-13 RX ADMIN — NICOTINE SCH: 7 PATCH TRANSDERMAL at 10:37

## 2021-03-13 RX ADMIN — Medication SCH MG: at 21:44

## 2021-03-13 RX ADMIN — DRONABINOL SCH MG: 2.5 CAPSULE ORAL at 06:46

## 2021-03-13 RX ADMIN — EMTRICITABINE AND TENOFOVIR ALAFENAMIDE SCH EACH: 200; 25 TABLET ORAL at 10:37

## 2021-03-13 RX ADMIN — LISINOPRIL SCH MG: 10 TABLET ORAL at 10:37

## 2021-03-13 RX ADMIN — DRONABINOL SCH MG: 2.5 CAPSULE ORAL at 10:39

## 2021-03-14 RX ADMIN — LISINOPRIL SCH MG: 10 TABLET ORAL at 10:27

## 2021-03-14 RX ADMIN — EMTRICITABINE AND TENOFOVIR ALAFENAMIDE SCH EACH: 200; 25 TABLET ORAL at 10:28

## 2021-03-14 RX ADMIN — Medication SCH MG: at 21:47

## 2021-03-14 RX ADMIN — NICOTINE SCH: 7 PATCH TRANSDERMAL at 10:28

## 2021-03-14 RX ADMIN — DOLUTEGRAVIR SODIUM SCH MG: 50 TABLET, FILM COATED ORAL at 10:28

## 2021-03-14 RX ADMIN — DRONABINOL SCH MG: 2.5 CAPSULE ORAL at 16:43

## 2021-03-14 RX ADMIN — DRONABINOL SCH MG: 2.5 CAPSULE ORAL at 06:15

## 2021-03-14 RX ADMIN — PANTOPRAZOLE SODIUM SCH MG: 40 TABLET, DELAYED RELEASE ORAL at 10:27

## 2021-03-14 RX ADMIN — Medication SCH TAB: at 10:27

## 2021-03-14 RX ADMIN — DRONABINOL SCH MG: 2.5 CAPSULE ORAL at 10:27

## 2021-03-15 RX ADMIN — DRONABINOL SCH MG: 2.5 CAPSULE ORAL at 17:24

## 2021-03-15 RX ADMIN — DOLUTEGRAVIR SODIUM SCH MG: 50 TABLET, FILM COATED ORAL at 10:44

## 2021-03-15 RX ADMIN — PANTOPRAZOLE SODIUM SCH MG: 40 TABLET, DELAYED RELEASE ORAL at 10:44

## 2021-03-15 RX ADMIN — EMTRICITABINE AND TENOFOVIR ALAFENAMIDE SCH EACH: 200; 25 TABLET ORAL at 10:44

## 2021-03-15 RX ADMIN — IBUPROFEN PRN MG: 400 TABLET, FILM COATED ORAL at 06:10

## 2021-03-15 RX ADMIN — DRONABINOL SCH MG: 2.5 CAPSULE ORAL at 06:09

## 2021-03-15 RX ADMIN — HYDROXYZINE PAMOATE PRN MG: 25 CAPSULE ORAL at 21:37

## 2021-03-15 RX ADMIN — DRONABINOL SCH MG: 2.5 CAPSULE ORAL at 10:46

## 2021-03-15 RX ADMIN — Medication SCH TAB: at 10:43

## 2021-03-15 RX ADMIN — Medication SCH MG: at 21:37

## 2021-03-15 RX ADMIN — NICOTINE SCH: 7 PATCH TRANSDERMAL at 10:44

## 2021-03-15 RX ADMIN — LISINOPRIL SCH: 10 TABLET ORAL at 10:44

## 2021-03-16 RX ADMIN — IBUPROFEN PRN MG: 400 TABLET, FILM COATED ORAL at 06:39

## 2021-03-16 RX ADMIN — NICOTINE SCH: 7 PATCH TRANSDERMAL at 10:26

## 2021-03-16 RX ADMIN — Medication SCH MG: at 21:58

## 2021-03-16 RX ADMIN — DRONABINOL SCH MG: 2.5 CAPSULE ORAL at 17:21

## 2021-03-16 RX ADMIN — Medication SCH TAB: at 10:25

## 2021-03-16 RX ADMIN — PANTOPRAZOLE SODIUM SCH MG: 40 TABLET, DELAYED RELEASE ORAL at 10:25

## 2021-03-16 RX ADMIN — LISINOPRIL SCH MG: 10 TABLET ORAL at 10:27

## 2021-03-16 RX ADMIN — DRONABINOL SCH MG: 2.5 CAPSULE ORAL at 06:39

## 2021-03-16 RX ADMIN — EMTRICITABINE AND TENOFOVIR ALAFENAMIDE SCH EACH: 200; 25 TABLET ORAL at 10:26

## 2021-03-16 RX ADMIN — DOLUTEGRAVIR SODIUM SCH MG: 50 TABLET, FILM COATED ORAL at 10:26

## 2021-03-17 RX ADMIN — Medication SCH MG: at 21:33

## 2021-03-17 RX ADMIN — DRONABINOL SCH MG: 2.5 CAPSULE ORAL at 11:16

## 2021-03-17 RX ADMIN — ACETAMINOPHEN PRN MG: 325 TABLET ORAL at 16:16

## 2021-03-17 RX ADMIN — DRONABINOL SCH MG: 2.5 CAPSULE ORAL at 06:38

## 2021-03-17 RX ADMIN — Medication SCH TAB: at 10:20

## 2021-03-17 RX ADMIN — DOLUTEGRAVIR SODIUM SCH MG: 50 TABLET, FILM COATED ORAL at 10:23

## 2021-03-17 RX ADMIN — PANTOPRAZOLE SODIUM SCH MG: 40 TABLET, DELAYED RELEASE ORAL at 10:24

## 2021-03-17 RX ADMIN — NICOTINE SCH: 7 PATCH TRANSDERMAL at 10:23

## 2021-03-17 RX ADMIN — EMTRICITABINE AND TENOFOVIR ALAFENAMIDE SCH EACH: 200; 25 TABLET ORAL at 10:23

## 2021-03-17 RX ADMIN — LISINOPRIL SCH MG: 10 TABLET ORAL at 10:24

## 2021-03-17 RX ADMIN — DRONABINOL SCH MG: 2.5 CAPSULE ORAL at 16:17

## 2021-03-17 RX ADMIN — ACETAMINOPHEN PRN MG: 325 TABLET ORAL at 10:24

## 2021-03-18 RX ADMIN — PANTOPRAZOLE SODIUM SCH MG: 40 TABLET, DELAYED RELEASE ORAL at 10:10

## 2021-03-18 RX ADMIN — EMTRICITABINE AND TENOFOVIR ALAFENAMIDE SCH EACH: 200; 25 TABLET ORAL at 10:10

## 2021-03-18 RX ADMIN — DOLUTEGRAVIR SODIUM SCH MG: 50 TABLET, FILM COATED ORAL at 10:10

## 2021-03-18 RX ADMIN — Medication SCH MG: at 21:14

## 2021-03-18 RX ADMIN — Medication SCH TAB: at 10:09

## 2021-03-18 RX ADMIN — LISINOPRIL SCH MG: 10 TABLET ORAL at 10:10

## 2021-03-18 RX ADMIN — NICOTINE SCH: 7 PATCH TRANSDERMAL at 10:10

## 2021-03-18 RX ADMIN — DRONABINOL SCH MG: 2.5 CAPSULE ORAL at 17:10

## 2021-03-18 RX ADMIN — DRONABINOL SCH MG: 2.5 CAPSULE ORAL at 10:56

## 2021-03-18 RX ADMIN — DRONABINOL SCH MG: 2.5 CAPSULE ORAL at 06:24

## 2021-03-19 RX ADMIN — Medication SCH MG: at 21:28

## 2021-03-19 RX ADMIN — DOLUTEGRAVIR SODIUM SCH MG: 50 TABLET, FILM COATED ORAL at 10:08

## 2021-03-19 RX ADMIN — EMTRICITABINE AND TENOFOVIR ALAFENAMIDE SCH EACH: 200; 25 TABLET ORAL at 10:08

## 2021-03-19 RX ADMIN — NICOTINE SCH: 7 PATCH TRANSDERMAL at 10:06

## 2021-03-19 RX ADMIN — DRONABINOL SCH MG: 2.5 CAPSULE ORAL at 10:07

## 2021-03-19 RX ADMIN — LISINOPRIL SCH MG: 10 TABLET ORAL at 10:07

## 2021-03-19 RX ADMIN — PANTOPRAZOLE SODIUM SCH MG: 40 TABLET, DELAYED RELEASE ORAL at 10:08

## 2021-03-19 RX ADMIN — DRONABINOL SCH MG: 2.5 CAPSULE ORAL at 16:36

## 2021-03-19 RX ADMIN — Medication SCH TAB: at 10:05

## 2021-03-19 RX ADMIN — DRONABINOL SCH MG: 2.5 CAPSULE ORAL at 06:17

## 2021-03-19 RX ADMIN — ACETAMINOPHEN PRN MG: 325 TABLET ORAL at 16:36

## 2021-03-19 RX ADMIN — ACETAMINOPHEN PRN MG: 325 TABLET ORAL at 07:29

## 2021-03-20 RX ADMIN — Medication SCH MG: at 21:40

## 2021-03-20 RX ADMIN — ACETAMINOPHEN PRN MG: 325 TABLET ORAL at 21:40

## 2021-03-20 RX ADMIN — EMTRICITABINE AND TENOFOVIR ALAFENAMIDE SCH EACH: 200; 25 TABLET ORAL at 10:06

## 2021-03-20 RX ADMIN — HYDROXYZINE PAMOATE PRN MG: 25 CAPSULE ORAL at 21:40

## 2021-03-20 RX ADMIN — PANTOPRAZOLE SODIUM SCH MG: 40 TABLET, DELAYED RELEASE ORAL at 10:04

## 2021-03-20 RX ADMIN — DRONABINOL SCH MG: 2.5 CAPSULE ORAL at 15:59

## 2021-03-20 RX ADMIN — Medication SCH TAB: at 10:04

## 2021-03-20 RX ADMIN — DRONABINOL SCH MG: 2.5 CAPSULE ORAL at 12:05

## 2021-03-20 RX ADMIN — NICOTINE SCH: 7 PATCH TRANSDERMAL at 10:04

## 2021-03-20 RX ADMIN — LISINOPRIL SCH MG: 10 TABLET ORAL at 10:03

## 2021-03-20 RX ADMIN — DRONABINOL SCH MG: 2.5 CAPSULE ORAL at 06:25

## 2021-03-20 RX ADMIN — DOLUTEGRAVIR SODIUM SCH MG: 50 TABLET, FILM COATED ORAL at 10:04

## 2021-03-21 RX ADMIN — LISINOPRIL SCH MG: 10 TABLET ORAL at 10:00

## 2021-03-21 RX ADMIN — DRONABINOL SCH MG: 2.5 CAPSULE ORAL at 16:09

## 2021-03-21 RX ADMIN — HYDROXYZINE PAMOATE PRN MG: 25 CAPSULE ORAL at 21:33

## 2021-03-21 RX ADMIN — PANTOPRAZOLE SODIUM SCH MG: 40 TABLET, DELAYED RELEASE ORAL at 10:00

## 2021-03-21 RX ADMIN — NICOTINE SCH: 7 PATCH TRANSDERMAL at 10:04

## 2021-03-21 RX ADMIN — Medication SCH TAB: at 10:00

## 2021-03-21 RX ADMIN — DOLUTEGRAVIR SODIUM SCH MG: 50 TABLET, FILM COATED ORAL at 10:02

## 2021-03-21 RX ADMIN — EMTRICITABINE AND TENOFOVIR ALAFENAMIDE SCH EACH: 200; 25 TABLET ORAL at 10:02

## 2021-03-21 RX ADMIN — DRONABINOL SCH MG: 2.5 CAPSULE ORAL at 11:03

## 2021-03-21 RX ADMIN — Medication SCH MG: at 21:33

## 2021-03-21 RX ADMIN — DRONABINOL SCH MG: 2.5 CAPSULE ORAL at 06:50

## 2021-03-21 RX ADMIN — ACETAMINOPHEN PRN MG: 325 TABLET ORAL at 10:00

## 2021-03-22 VITALS — HEART RATE: 79 BPM | SYSTOLIC BLOOD PRESSURE: 148 MMHG | TEMPERATURE: 97.7 F | DIASTOLIC BLOOD PRESSURE: 79 MMHG

## 2021-03-22 RX ADMIN — Medication SCH TAB: at 10:00

## 2021-03-22 RX ADMIN — NICOTINE SCH: 7 PATCH TRANSDERMAL at 10:00

## 2021-03-22 RX ADMIN — DRONABINOL SCH MG: 2.5 CAPSULE ORAL at 10:02

## 2021-03-22 RX ADMIN — DOLUTEGRAVIR SODIUM SCH MG: 50 TABLET, FILM COATED ORAL at 10:00

## 2021-03-22 RX ADMIN — LISINOPRIL SCH MG: 10 TABLET ORAL at 10:00

## 2021-03-22 RX ADMIN — EMTRICITABINE AND TENOFOVIR ALAFENAMIDE SCH EACH: 200; 25 TABLET ORAL at 10:00

## 2021-03-22 RX ADMIN — DRONABINOL SCH MG: 2.5 CAPSULE ORAL at 06:15

## 2021-03-22 RX ADMIN — PANTOPRAZOLE SODIUM SCH MG: 40 TABLET, DELAYED RELEASE ORAL at 10:00

## 2021-03-22 RX ADMIN — ACETAMINOPHEN PRN MG: 325 TABLET ORAL at 06:15
